# Patient Record
Sex: MALE | Race: WHITE | NOT HISPANIC OR LATINO | Employment: FULL TIME | URBAN - METROPOLITAN AREA
[De-identification: names, ages, dates, MRNs, and addresses within clinical notes are randomized per-mention and may not be internally consistent; named-entity substitution may affect disease eponyms.]

---

## 2022-10-11 ENCOUNTER — APPOINTMENT (OUTPATIENT)
Dept: GENERAL RADIOLOGY | Facility: HOSPITAL | Age: 37
End: 2022-10-11

## 2022-10-11 ENCOUNTER — HOSPITAL ENCOUNTER (EMERGENCY)
Facility: HOSPITAL | Age: 37
Discharge: HOME | End: 2022-10-11
Attending: EMERGENCY MEDICINE | Admitting: EMERGENCY MEDICINE

## 2022-10-11 VITALS
BODY MASS INDEX: 53.78 KG/M2 | SYSTOLIC BLOOD PRESSURE: 154 MMHG | TEMPERATURE: 98.1 F | HEART RATE: 90 BPM | RESPIRATION RATE: 18 BRPM | WEIGHT: 315 LBS | HEIGHT: 64 IN | DIASTOLIC BLOOD PRESSURE: 98 MMHG | OXYGEN SATURATION: 96 %

## 2022-10-11 DIAGNOSIS — S16.1XXA STRAIN OF NECK MUSCLE, INITIAL ENCOUNTER: Primary | ICD-10-CM
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
## 2022-10-11 PROCEDURE — 99282 EMERGENCY DEPT VISIT SF MDM: CPT

## 2022-10-11 PROCEDURE — 72040 X-RAY EXAM NECK SPINE 2-3 VW: CPT

## 2022-10-12 NOTE — DISCHARGE INSTRUCTIONS
Cool compress, deep tissue massage, rest, gentle stretching, Tylenol or Motrin for discomfort; return for increased pain, numbness, weakness or any other concern   Patient
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
## 2023-11-02 ENCOUNTER — HOSPITAL ENCOUNTER (EMERGENCY)
Facility: HOSPITAL | Age: 38
Discharge: HOME | End: 2023-11-02
Attending: EMERGENCY MEDICINE
Payer: COMMERCIAL

## 2023-11-02 VITALS
OXYGEN SATURATION: 99 % | SYSTOLIC BLOOD PRESSURE: 171 MMHG | DIASTOLIC BLOOD PRESSURE: 102 MMHG | HEART RATE: 84 BPM | HEIGHT: 66 IN | TEMPERATURE: 98 F | BODY MASS INDEX: 50.62 KG/M2 | RESPIRATION RATE: 18 BRPM | WEIGHT: 315 LBS

## 2023-11-02 DIAGNOSIS — H10.9 CONJUNCTIVITIS OF LEFT EYE, UNSPECIFIED CONJUNCTIVITIS TYPE: Primary | ICD-10-CM

## 2023-11-02 PROCEDURE — 99282 EMERGENCY DEPT VISIT SF MDM: CPT

## 2023-11-02 RX ORDER — OFLOXACIN 3 MG/ML
2 SOLUTION/ DROPS OPHTHALMIC 4 TIMES DAILY
Qty: 5 ML | Refills: 0 | Status: SHIPPED | OUTPATIENT
Start: 2023-11-02 | End: 2023-11-09

## 2023-11-02 RX ORDER — PROPARACAINE HYDROCHLORIDE 5 MG/ML
SOLUTION/ DROPS OPHTHALMIC
Status: COMPLETED
Start: 2023-11-02 | End: 2023-11-02

## 2023-11-02 RX ADMIN — PROPARACAINE HYDROCHLORIDE: 5 SOLUTION/ DROPS OPHTHALMIC at 13:50

## 2023-11-02 RX ADMIN — FLUORESCEIN SODIUM: 1 STRIP OPHTHALMIC at 13:50

## 2023-11-02 NOTE — ED PROVIDER NOTES
"Subjective   History of Present Illness  Chief complaint: Left eye pain    38-year-old male presents with left eye pain.  Patient states pain started yesterday morning.  He has had some tearing of the left eye as well.  He felt maybe something was stuck in his eye so he flushed it at work but symptoms continue.  He denies fever.  No known sick contacts.    History provided by:  Patient      Review of Systems   Constitutional:  Negative for fever.   HENT:  Negative for congestion.    Eyes:  Positive for pain.   Respiratory:  Negative for cough and shortness of breath.    Cardiovascular:  Negative for chest pain.   Gastrointestinal:  Negative for abdominal pain.       No past medical history on file.    No Known Allergies    No past surgical history on file.    No family history on file.    Social History     Socioeconomic History    Marital status: Single       BP (!) 197/108 (BP Location: Right arm, Patient Position: Sitting)   Pulse 86   Temp 98.4 °F (36.9 °C)   Resp 12   Ht 167.6 cm (66\")   Wt (!) 149 kg (329 lb 5.9 oz)   SpO2 98%   BMI 53.16 kg/m²       Objective   Physical Exam  Vitals and nursing note reviewed.   Constitutional:       Appearance: Normal appearance.   HENT:      Head: Normocephalic and atraumatic.      Mouth/Throat:      Mouth: Mucous membranes are moist.   Eyes:      General: Lids are normal. Lids are everted, no foreign bodies appreciated.         Left eye: No foreign body.      Extraocular Movements: Extraocular movements intact.      Conjunctiva/sclera:      Left eye: Left conjunctiva is injected.      Pupils: Pupils are equal, round, and reactive to light.      Left eye: No corneal abrasion or fluorescein uptake.   Cardiovascular:      Rate and Rhythm: Normal rate and regular rhythm.   Pulmonary:      Effort: Pulmonary effort is normal. No respiratory distress.   Skin:     General: Skin is warm and dry.   Neurological:      Mental Status: He is alert and oriented to person, place, " and time.         Procedures           ED Course                                           Medical Decision Making    Exam is consistent with conjunctivitis.  Patient is a contact lens wearer.  He will be given a prescription for ofloxacin.  He is to follow-up with his eye doctor.      Final diagnoses:   Conjunctivitis of left eye, unspecified conjunctivitis type       ED Disposition  ED Disposition       ED Disposition   Discharge    Condition   Stable    Comment   --               [facility] - [place] [id number] [facility] [address]  [facility] [id number] [phone]  Call in 1 day           Medication List        New Prescriptions      ofloxacin 0.3 % ophthalmic solution  Commonly known as: Ocuflox  Administer 2 drops into the left eye 4 (Four) Times a Day for 7 days.               Where to Get Your Medications        These medications were sent to [facility] [place] - [address] [place] [address] - [phone]  - [phone]   [address] [facility] [place] [address]      Phone: [phone]   ofloxacin 0.3 % ophthalmic solution            Edward Casarez MD  11/02/23 0998

## 2023-11-02 NOTE — DISCHARGE INSTRUCTIONS
Follow-up with your eye doctor.  Return to the emergency room for any new or worsening symptoms or if you have any other questions or concerns.  Use eyedrops as directed.

## 2023-11-02 NOTE — Clinical Note
[facility] EMERGENCY DEPARTMENT  [address] [facility] [place] [address]  Phone: [phone]    Wesley Kendrick was seen and treated in our emergency department on 11/2/2023.  He may return to work on 11/03/2023.         Thank you for choosing [facility].    Edward Casarez MD